# Patient Record
Sex: MALE | URBAN - METROPOLITAN AREA
[De-identification: names, ages, dates, MRNs, and addresses within clinical notes are randomized per-mention and may not be internally consistent; named-entity substitution may affect disease eponyms.]

---

## 2023-09-26 PROCEDURE — 99283 EMERGENCY DEPT VISIT LOW MDM: CPT

## 2023-09-27 ENCOUNTER — APPOINTMENT (OUTPATIENT)
Dept: RADIOLOGY | Facility: MEDICAL CENTER | Age: 33
End: 2023-09-27
Attending: EMERGENCY MEDICINE
Payer: MEDICARE

## 2023-09-27 ENCOUNTER — HOSPITAL ENCOUNTER (EMERGENCY)
Facility: MEDICAL CENTER | Age: 33
End: 2023-09-27
Attending: EMERGENCY MEDICINE
Payer: MEDICARE

## 2023-09-27 VITALS
SYSTOLIC BLOOD PRESSURE: 131 MMHG | HEIGHT: 72 IN | HEART RATE: 85 BPM | WEIGHT: 245.81 LBS | DIASTOLIC BLOOD PRESSURE: 90 MMHG | BODY MASS INDEX: 33.29 KG/M2 | RESPIRATION RATE: 18 BRPM | OXYGEN SATURATION: 97 % | TEMPERATURE: 97 F

## 2023-09-27 DIAGNOSIS — R10.32 LEFT INGUINAL PAIN: ICD-10-CM

## 2023-09-27 LAB
APPEARANCE UR: CLEAR
BILIRUB UR QL STRIP.AUTO: NEGATIVE
C TRACH DNA SPEC QL NAA+PROBE: NEGATIVE
COLOR UR: YELLOW
GLUCOSE UR STRIP.AUTO-MCNC: NEGATIVE MG/DL
KETONES UR STRIP.AUTO-MCNC: NEGATIVE MG/DL
LEUKOCYTE ESTERASE UR QL STRIP.AUTO: NEGATIVE
MICRO URNS: NORMAL
N GONORRHOEA DNA SPEC QL NAA+PROBE: NEGATIVE
NITRITE UR QL STRIP.AUTO: NEGATIVE
PH UR STRIP.AUTO: 6 [PH] (ref 5–8)
PROT UR QL STRIP: NEGATIVE MG/DL
RBC UR QL AUTO: NEGATIVE
SP GR UR STRIP.AUTO: 1.01
SPECIMEN SOURCE: NORMAL
UROBILINOGEN UR STRIP.AUTO-MCNC: 0.2 MG/DL

## 2023-09-27 PROCEDURE — 87491 CHLMYD TRACH DNA AMP PROBE: CPT

## 2023-09-27 PROCEDURE — 700102 HCHG RX REV CODE 250 W/ 637 OVERRIDE(OP): Performed by: EMERGENCY MEDICINE

## 2023-09-27 PROCEDURE — 87591 N.GONORRHOEAE DNA AMP PROB: CPT

## 2023-09-27 PROCEDURE — 81003 URINALYSIS AUTO W/O SCOPE: CPT

## 2023-09-27 PROCEDURE — A9270 NON-COVERED ITEM OR SERVICE: HCPCS | Performed by: EMERGENCY MEDICINE

## 2023-09-27 PROCEDURE — 76870 US EXAM SCROTUM: CPT

## 2023-09-27 RX ORDER — IBUPROFEN 200 MG
400 TABLET ORAL ONCE
Status: COMPLETED | OUTPATIENT
Start: 2023-09-27 | End: 2023-09-27

## 2023-09-27 RX ADMIN — IBUPROFEN 400 MG: 200 TABLET, FILM COATED ORAL at 01:50

## 2023-09-27 ASSESSMENT — PAIN DESCRIPTION - PAIN TYPE
TYPE: ACUTE PAIN
TYPE: ACUTE PAIN

## 2023-09-27 NOTE — ED PROVIDER NOTES
ER Provider Note    Scribed for Jude Canela M.d. by Dakotah Crawford. 9/27/2023  1:06 AM    Primary Care Provider: No primary care provider noted.    CHIEF COMPLAINT  Chief Complaint   Patient presents with    Groin Pain     Left side x 2 days. Patient denies injury       HPI/ROS  LIMITATION TO HISTORY   Select: : None  OUTSIDE HISTORIAN(S):  None    Isidro Avila is a 32 y.o. male who presents to the ED complaining of left sided groin pain onset 2 days ago. The patient reports his groin pain with no associated symptoms. The patient states he was smoking a vape and coughing at the time of symptom onset. The patient states he believes vaping could be the cause of his current symptoms. The patient denies any pain to the testicles, dysuria, or abnormal discharge from the penis. The patient reports stress exacerbates pain. The patient states he first noticed similar symptoms when he first began vaping. He states he began feeling similar symptoms in July, stopped vaping, took antibiotics with symptom alleviation, and began vaping again once symptoms resolved. The patient states he has taken Advil 200 mg to treat his symptoms prior to arrival.     PAST MEDICAL HISTORY  History reviewed. No pertinent past medical history.    SURGICAL HISTORY  History reviewed. No pertinent surgical history.    FAMILY HISTORY  History reviewed. No pertinent family history.    SOCIAL HISTORY   reports that he has never smoked. He has never used smokeless tobacco. He reports that he does not currently use alcohol. He reports current drug use. Drug: Inhaled.    CURRENT MEDICATIONS  There are no discharge medications for this patient.      ALLERGIES  Patient has no allergy information on record.    PHYSICAL EXAM  /83   Pulse 100   Temp (!) 35.8 °C (96.4 °F) (Temporal)   Resp 18   Ht 1.829 m (6')   Wt 112 kg (245 lb 13 oz)   SpO2 97%   BMI 33.34 kg/m²   Gen: Alert, no acute distress  HEENT: no lymphadenopathy, ATNC  Neck: trachea  midline  Resp: no respiratory distress  CV: No JVD  Abd: Soft, non-tender, non-distended  Ext: No deformities  : Normal genitalia, slight left epididymal tenderness.   Psych: normal mood   Neuro: speech fluent      DIAGNOSTIC STUDIES    Labs:   Labs Reviewed   URINALYSIS    Narrative:     Release to patient->Immediate   CHLAMYDIA/GC, PCR (URINE)    Narrative:     Release to patient->Immediate      Radiology:   The attending emergency physician has independently interpreted the diagnostic imaging associated with this visit and am waiting the final reading from the radiologist.   Preliminary interpretation is a follows: Ultrasound scrotum: No evidence of ischemia  Radiologist interpretation:   [id number]   Final Result         1.  Small bilateral hydroceles.   2.  Bilateral epididymis appears slightly echogenic which could represent increased epididymal fat.   3.  Otherwise unremarkable renal sonogram.           COURSE & MEDICAL DECISION MAKING     ED Observation Status? No, the patient does not meet the criteria for ED observation at this time.     INITIAL ASSESSMENT, COURSE AND PLAN  Care Narrative: Patient presents with intermittent left groin pain.  No obvious signs of rash to suggest HSV, no evidence of granuloma inguinalis 8, syphilitic chancre, mother significant abnormalities.  Benign abdominal examination, no evidence of hernia, including with Valsalva maneuver.  Urinalysis negative for urinary tract infection.  Ultrasound of the scrotum reassuring.  Unclear ultimate etiology for the patient's symptoms but does not appear to be dangerous at this time.  He is advised to follow-up with his primary care provider.        1:13 AM - Patient is a 32 year old male who presents today for evaluation of groin pain onset 2 days ago. They have been experiencing no associated symptoms, but denies any pain to the testicles, dysuria, or abnormal discharge from the penis. See HPI for further details. Patient seen  and examined at bedside. Discussed plan of care, including medication for pain and diagnostic workup. Patient agrees to the plan of care.    3:16 AM - I then informed the patient of my plan for discharge, which includes strict return precautions for any new or worsening symptoms. Patient understands and verbalizes agreement to plan of care. Patient is comfortable going home at this time.          DISPOSITION AND DISCUSSIONS  I have discussed management of the patient with the following physicians and VEE's:  None    Discussion of management with other [facility] or appropriate source(s): None     Escalation of care considered, and ultimately not performed: the patient was evaluated by myself, after discussion I have recommended the patient to be discharged.      Decision tools and prescription drugs considered including, but not limited to: Antibiotics not indicated at this time .    The patient will return for new or worsening symptoms and is stable at the time of discharge.    The patient is referred to a primary physician for blood pressure management, diabetic screening, and for all other preventative health concerns.    DISPOSITION:  Patient will be discharged home in stable condition.    FOLLOW UP:  [facility], Emergency Dept  [address] [place] [id number] [phone]    If symptoms worsen    Your regular doctor.  To establish a primary care provider within our system, please call [phone]    Schedule an appointment as soon as possible for a visit         FINAL DIANGOSIS  1. Left inguinal pain          Dakotah FRIEDMAN (Rashida), am scribing for, and in the presence of, Jude Canela M.D..    Electronically signed by: Dakotah Crawford (Rashida), 9/27/2023    Jude FRIEDMAN M.D. personally performed the services described in this documentation, as scribed by Dakotah Crawford in my presence, and it is both accurate and complete.     The note accurately reflects work and decisions made by  me.  Jude Canela M.D.  9/27/2023  3:53 AM

## 2023-09-27 NOTE — DISCHARGE INSTRUCTIONS
You were seen in the emergency department for groin pain.  Your physical exam was reassuring.  Your urinalysis shows no signs of an infection with in the urine or prostate.  The ultrasound was reassuring.  Your testicles appear normal.  At this point there is no signs of major infection.  Some testing has been sent and will result in the next 1 to 2 days.  He will be contacted if this is abnormal.    We recommend following up with your regular doctor.    Return to the emergency department or seek medical attention if you develop:  Difficulty peeing, fevers, worsening pain, any other new or concerning findings

## 2023-09-27 NOTE — ED NOTES
Ambulatory to room Yellow [address] with same report as triage. Alert oriented not in any form of respiratory distress noted.  Chart up for ERP to see.

## 2023-09-27 NOTE — ED TRIAGE NOTES
Chief Complaint   Patient presents with    Groin Pain     Left side x 2 days. Patient denies injury

## 2023-09-27 NOTE — ED NOTES
Pt discharged to home. Discharge paperwork provided. Education provided by ERP. Reinforced discharge instructions.  Pt was given follow up instructions. Pt verbalized understanding of all instructions for discharge.   Patient went out of the ER ambulatory with steady gait., alert and oriented x 4, with all belongings.

## 2025-01-07 ENCOUNTER — HOSPITAL ENCOUNTER (EMERGENCY)
Facility: MEDICAL CENTER | Age: 35
End: 2025-01-07
Attending: EMERGENCY MEDICINE
Payer: MEDICARE

## 2025-01-07 VITALS
BODY MASS INDEX: 31.23 KG/M2 | HEART RATE: 99 BPM | OXYGEN SATURATION: 97 % | RESPIRATION RATE: 16 BRPM | DIASTOLIC BLOOD PRESSURE: 78 MMHG | WEIGHT: 235.67 LBS | HEIGHT: 73 IN | TEMPERATURE: 97.8 F | SYSTOLIC BLOOD PRESSURE: 135 MMHG

## 2025-01-07 DIAGNOSIS — H65.191 ACUTE EFFUSION OF RIGHT EAR: ICD-10-CM

## 2025-01-07 PROCEDURE — 700102 HCHG RX REV CODE 250 W/ 637 OVERRIDE(OP): Performed by: EMERGENCY MEDICINE

## 2025-01-07 PROCEDURE — 99283 EMERGENCY DEPT VISIT LOW MDM: CPT

## 2025-01-07 RX ORDER — DEXAMETHASONE 4 MG/1
6 TABLET ORAL DAILY
Status: DISCONTINUED | OUTPATIENT
Start: 2025-01-07 | End: 2025-01-07 | Stop reason: HOSPADM

## 2025-01-07 RX ADMIN — DEXAMETHASONE 6 MG: 4 TABLET ORAL at 15:49

## 2025-01-07 NOTE — DISCHARGE INSTRUCTIONS
You have fluid behind your eardrum, is not infected.  You are being placed on a steroid medication to try to help this to drain.    Return for any change or worsening symptoms hopefully this should resolve in 2 to 3 days.

## 2025-01-07 NOTE — ED TRIAGE NOTES
Chief Complaint   Patient presents with    Ear Pain     Right ear pain x1 day       Pt is alert and oriented, speaking in full sentences, follows commands and responds appropriately to questions. Resperations are even and unlabored.      Pt placed in lobby. Pt educated on triage process. Pt encouraged to alert staff for any changes.

## 2025-01-07 NOTE — ED PROVIDER NOTES
"ER Provider Note    Scribed for Seymour Godoy D.O. by Maureen Kelly. 1/7/2025  3:31 PM    Primary Care Provider: None pertinent.    CHIEF COMPLAINT  Chief Complaint   Patient presents with    Ear Pain     Right ear pain x1 day       HPI/ROS      Isidro Avila is a 34 y.o. male who presents to the Emergency Department for right ear water in the ear feeling onset yesterday. Patient denies ear pain, fever, cough, congestion, or rhinorrhea. He has not been submerged in water recently, but only has taken showers. No alleviating or exacerbating factors noted.     ROS as per HPI.    PAST MEDICAL HISTORY  History reviewed. No pertinent past medical history.    SURGICAL HISTORY  History reviewed. No pertinent surgical history.    FAMILY HISTORY  History reviewed. No pertinent family history.    SOCIAL HISTORY   reports that he has never smoked. He has never used smokeless tobacco. He reports that he does not currently use alcohol. He reports current drug use. Drug: Inhaled.    CURRENT MEDICATIONS  There are no discharge medications for this patient.      ALLERGIES  Patient has no known allergies.    PHYSICAL EXAM  /80   Pulse 100   Temp 36.1 °C (97 °F) (Temporal)   Resp 16   Ht 1.854 m (6' 1\")   Wt 107 kg (235 lb 10.8 oz)   SpO2 98%   BMI 31.09 kg/m²     Constitutional: Alert in no apparent distress.  HENT: Normocephalic, Atraumatic, Right ear has dullness, small effusion, no infection.  Eyes: Conjunctiva normal, non-icteric.   Heart: No peripheral cyanosis   Lungs: Respirations are even and unlabored   Skin: Warm, Dry, normal color.   Neurologic: Alert, Grossly non-focal.   Psychiatric: Affect normal, Judgment normal, Mood normal, Appears appropriate and not intoxicated.       INITIAL ASSESSMENT  Patient has tympanic middle ear effusion. Does not require antibiotics, will give steroid dose to see if this might help.    ED Observation Status? No; Patient does not meet criteria for ED Observation. "     COURSE & MEDICAL DECISION MAKING     COURSE AND PLAN  3:31 PM - Patient seen and examined at bedside. Discussed plan of care, which patient agrees to. Physical exam shows right ear has dullness, small effusion, no infection. The patient will be medicated with Decadron 6 mg PO.     ED Summary: This patient has feeling that there is water in his ear.  There is an effusion without infection.  Placed on steroids to see if this can we reduce the effusion and discharged home.        DISPOSITION AND DISCUSSIONS  I have discussed management of the patient with the following physicians and VEE's: None    Discussion of management with other [facility] or appropriate source(s): None    Barriers to care at this time, including but not limited to: Lack of primary care     The patient will return for new or worsening symptoms and is stable at the time of discharge.    DISPOSITION:  Patient will be discharged home in stable condition.    FOLLOW UP:  Pending sale to [facility] [address]  [facility] [address]  [phone]  In 1 week        OUTPATIENT MEDICATIONS:  There are no discharge medications for this patient.      FINAL DIAGNOSIS  1. Acute effusion of right ear        Maureen FRIEDMAN (Rashida), am scribing for, and in the presence of, Seymour Godoy D.O..    Electronically signed by: Maureen Kelly (Rashida), 1/7/2025    Seymour FRIEDMAN D.O. personally performed the services described in this documentation, as scribed by Maureen Kelly in my presence, and it is both accurate and complete.     The note accurately reflects work and decisions made by me.  Seymour Godoy D.O.  1/7/2025  8:28 PM